# Patient Record
Sex: MALE | NOT HISPANIC OR LATINO | URBAN - METROPOLITAN AREA
[De-identification: names, ages, dates, MRNs, and addresses within clinical notes are randomized per-mention and may not be internally consistent; named-entity substitution may affect disease eponyms.]

---

## 2017-05-02 ENCOUNTER — OFFICE (OUTPATIENT)
Dept: URBAN - METROPOLITAN AREA CLINIC 11 | Facility: CLINIC | Age: 42
End: 2017-05-02

## 2017-05-02 VITALS
HEART RATE: 83 BPM | RESPIRATION RATE: 16 BRPM | SYSTOLIC BLOOD PRESSURE: 113 MMHG | WEIGHT: 173 LBS | HEIGHT: 71 IN | DIASTOLIC BLOOD PRESSURE: 77 MMHG

## 2017-05-02 DIAGNOSIS — R94.5 ABNORMAL RESULTS OF LIVER FUNCTION STUDIES: ICD-10-CM

## 2017-05-02 DIAGNOSIS — E80.4 GILBERT SYNDROME: ICD-10-CM

## 2017-05-02 DIAGNOSIS — J06.9 ACUTE UPPER RESPIRATORY INFECTION, UNSPECIFIED: ICD-10-CM

## 2017-05-02 DIAGNOSIS — K21.9 GASTRO-ESOPHAGEAL REFLUX DISEASE WITHOUT ESOPHAGITIS: ICD-10-CM

## 2017-05-02 LAB
CBC COMPLETE BLOOD COUNT W/O DIFF: HEMATOCRIT: 48.6 % (ref 39–55)
CBC COMPLETE BLOOD COUNT W/O DIFF: HEMOGLOBIN: 17.9 G/DL — HIGH (ref 13–17.5)
CBC COMPLETE BLOOD COUNT W/O DIFF: MCH: 35 PG (ref 25–35)
CBC COMPLETE BLOOD COUNT W/O DIFF: MCHC: 36.8 % (ref 30–38)
CBC COMPLETE BLOOD COUNT W/O DIFF: MCV: 94.9 FL (ref 78–102)
CBC COMPLETE BLOOD COUNT W/O DIFF: PLATELET COUNT: 194 K/UL (ref 150–450)
CBC COMPLETE BLOOD COUNT W/O DIFF: RBC DISTRIBUTION WIDTH: 13.3 % (ref 11.5–16)
CBC COMPLETE BLOOD COUNT W/O DIFF: RED BLOOD CELL COUNT: 5.12 M/UL (ref 4.3–5.7)
CBC COMPLETE BLOOD COUNT W/O DIFF: WHITE BLOOD CELL COUNT: 9.1 K/UL (ref 4–11)
GAMMA GLUTAMYL TRANSFERASE: GGT: 782 U/L — HIGH (ref 9–64)
HEPATIC FUNCTION PANEL A: ALBUMIN: 4.3 G/DL (ref 3.5–5.2)
HEPATIC FUNCTION PANEL A: ALKALINE PHOSPHATASE: 162 U/L — HIGH (ref 46–118)
HEPATIC FUNCTION PANEL A: DIRECT BILIRUBIN: 0.4 MG/DL — HIGH (ref 0–0.2)
HEPATIC FUNCTION PANEL A: SGOT (AST): <5 U/L — LOW
HEPATIC FUNCTION PANEL A: SGPT (ALT): 63 U/L — HIGH (ref 7–52)
HEPATIC FUNCTION PANEL A: TOTAL BILIRUBIN: 1.7 MG/DL — HIGH (ref 0.3–1.2)
HEPATIC FUNCTION PANEL A: TOTAL PROTEIN: 6.5 G/DL (ref 6.4–8.3)

## 2017-05-02 PROCEDURE — 99213 OFFICE O/P EST LOW 20 MIN: CPT | Performed by: INTERNAL MEDICINE

## 2017-05-02 RX ORDER — AZITHROMYCIN DIHYDRATE 250 MG/1
TABLET, FILM COATED ORAL
Qty: 6 | Refills: 0 | Status: COMPLETED
Start: 2017-05-02 | End: 2017-06-16

## 2017-05-02 NOTE — SERVICEHPINOTES
He has been having issues with a URI with a cough for about a week with a whitish to yellow mucus.  He has developed a sinus drainage over the past 2 days.  He has been using a Nyuil type product. He has not had fevers or chills.  He has had some lower chest pain with a feeling of sob with the cough.  He has not been seen for this yet.  As far as his reflux, he has been on the Prilosec and has been donig well.  He has been taking it about once every three days and has not ahd issues while on it.  He has not had issues with dysphagia.  He has not had any particular diet issues that cause problems.

## 2017-05-02 NOTE — SERVICENOTES
We discussed his normal LFTS and the f/u testing.  We also discussed his GERD, the use of the prilosec with intermittent dosing and the GERD diet.  As to the URI, I will start him on a Z-mat.  He was instructed to f/u with Dr. Quintanilla if his sx are not improving over the next couple of days.

## 2017-06-12 ENCOUNTER — INPATIENT HOSPITAL (OUTPATIENT)
Dept: URBAN - METROPOLITAN AREA HOSPITAL 130 | Facility: HOSPITAL | Age: 42
End: 2017-06-12

## 2017-06-12 DIAGNOSIS — K85.90 ACUTE PANCREATITIS WITHOUT NECROSIS OR INFECTION, UNSPECIFIE: ICD-10-CM

## 2017-06-12 DIAGNOSIS — E78.1 PURE HYPERGLYCERIDEMIA: ICD-10-CM

## 2017-06-12 PROCEDURE — 99254 IP/OBS CNSLTJ NEW/EST MOD 60: CPT | Performed by: INTERNAL MEDICINE

## 2017-06-13 PROCEDURE — 99231 SBSQ HOSP IP/OBS SF/LOW 25: CPT | Performed by: INTERNAL MEDICINE

## 2017-06-14 ENCOUNTER — INPATIENT HOSPITAL (OUTPATIENT)
Dept: URBAN - METROPOLITAN AREA HOSPITAL 130 | Facility: HOSPITAL | Age: 42
End: 2017-06-14
Payer: COMMERCIAL

## 2017-06-14 DIAGNOSIS — K85.90 ACUTE PANCREATITIS WITHOUT NECROSIS OR INFECTION, UNSPECIFIE: ICD-10-CM

## 2017-06-14 DIAGNOSIS — E78.1 PURE HYPERGLYCERIDEMIA: ICD-10-CM

## 2017-06-14 PROCEDURE — 99231 SBSQ HOSP IP/OBS SF/LOW 25: CPT | Performed by: INTERNAL MEDICINE

## 2017-06-16 ENCOUNTER — OFFICE (OUTPATIENT)
Dept: URBAN - METROPOLITAN AREA CLINIC 11 | Facility: CLINIC | Age: 42
End: 2017-06-16
Payer: COMMERCIAL

## 2017-06-16 VITALS
SYSTOLIC BLOOD PRESSURE: 113 MMHG | WEIGHT: 170 LBS | HEIGHT: 71 IN | RESPIRATION RATE: 16 BRPM | HEART RATE: 77 BPM | DIASTOLIC BLOOD PRESSURE: 81 MMHG

## 2017-06-16 DIAGNOSIS — R94.5 ABNORMAL RESULTS OF LIVER FUNCTION STUDIES: ICD-10-CM

## 2017-06-16 DIAGNOSIS — K85.90 ACUTE PANCREATITIS WITHOUT NECROSIS OR INFECTION, UNSPECIFIE: ICD-10-CM

## 2017-06-16 DIAGNOSIS — E11.9 TYPE 2 DIABETES MELLITUS WITHOUT COMPLICATIONS: ICD-10-CM

## 2017-06-16 PROCEDURE — 99214 OFFICE O/P EST MOD 30 MIN: CPT | Performed by: INTERNAL MEDICINE

## 2017-06-16 PROCEDURE — 36415 COLL VENOUS BLD VENIPUNCTURE: CPT | Performed by: INTERNAL MEDICINE

## 2017-06-16 NOTE — SERVICEHPINOTES
He was admitted last week to [facility] for acute abdominal pain and was dx with pancreatitis. His Ct revealed inflammatory changes of the pancreatic tail. He was noted to have triglycerides of 1700.   He was treated with diet restrictions.  After improving he was d/c for outpt f/u.  He did have fish oil added to his meds.  He has been having a pain in the right flank since then and has been using oxycodone for the pain about twice a day.  The pain is worse with walking and activities.  He has not had fevers or chills.  He has not had n/v issues.  Eating has some effect on the pain but it is mostly worse with activity.  His consult notes were reveiwed. BR

## 2017-06-16 NOTE — SERVICENOTES
We discussed the new issue with the pancreatitis and the elevated triglycerides.  We discussed the pancreatitis related to this issue can be quite severe and lethal if uncontrolled.  I would like him to see the lipid clinic at [facility] and he will need to f/u with Dr. Quintanilla about his DM control and possibly with endocrine.  He will need a f/u Ct of the pancreas to ensure resolution and if there are still issues possibly an MRCP (r/o divisum, occult stones, etc...).  Of note he has not had stones on CT or u/s.   We discussed his need to avoid all ETOH and again discussed the elevated GGT (?occult ETOH use).  We reivewed his meds and he is on a metformin combination medication which is not listed to cause pancreatitis and his valsartan is not in combination with HCTZ.

## 2017-06-17 LAB
AMYLASE: AMYLASE,SERUM: 33 U/L (ref 28–120)
CBCI COMPLETE BLOOD COUNT W/ DIFF: ABS BASOPHILS: 0 K/UL (ref 0–0.3)
CBCI COMPLETE BLOOD COUNT W/ DIFF: ABS EOSINOPHILS: 0.4 K/UL (ref 0.05–0.5)
CBCI COMPLETE BLOOD COUNT W/ DIFF: ABS LYMPHOCYTES: 1.8 K/UL (ref 1–4)
CBCI COMPLETE BLOOD COUNT W/ DIFF: ABS MONOCYTES: 0.4 K/UL (ref 0.1–1.1)
CBCI COMPLETE BLOOD COUNT W/ DIFF: ABS NEUTROPHILS: 3.6 K/UL (ref 1.8–7)
CBCI COMPLETE BLOOD COUNT W/ DIFF: BASOPHILS: 0 % (ref 0–3)
CBCI COMPLETE BLOOD COUNT W/ DIFF: EOSINOPHILS: 6 % (ref 1–7)
CBCI COMPLETE BLOOD COUNT W/ DIFF: HEMATOCRIT: 45.5 % (ref 39–55)
CBCI COMPLETE BLOOD COUNT W/ DIFF: HEMOGLOBIN: 15.4 G/DL (ref 13–17.5)
CBCI COMPLETE BLOOD COUNT W/ DIFF: LYMPHOCYTES: 27 % (ref 20–48)
CBCI COMPLETE BLOOD COUNT W/ DIFF: MCH: 32.4 PG (ref 25–35)
CBCI COMPLETE BLOOD COUNT W/ DIFF: MCHC: 33.8 % (ref 30–38)
CBCI COMPLETE BLOOD COUNT W/ DIFF: MCV: 95.8 FL (ref 78–102)
CBCI COMPLETE BLOOD COUNT W/ DIFF: MONOCYTES: 6 % (ref 3–14)
CBCI COMPLETE BLOOD COUNT W/ DIFF: NEUTROPHILS: 56 % (ref 40–70)
CBCI COMPLETE BLOOD COUNT W/ DIFF: PLATELET COUNT: 214 K/UL (ref 150–450)
CBCI COMPLETE BLOOD COUNT W/ DIFF: PLT MORPHOLOGY: (no result)
CBCI COMPLETE BLOOD COUNT W/ DIFF: RBC DISTRIBUTION WIDTH: 13.2 % (ref 11.5–16)
CBCI COMPLETE BLOOD COUNT W/ DIFF: RBC MORPH: (no result)
CBCI COMPLETE BLOOD COUNT W/ DIFF: RED BLOOD CELL COUNT: 4.75 M/UL (ref 4.3–5.7)
CBCI COMPLETE BLOOD COUNT W/ DIFF: WHITE BLOOD CELL COUNT: 6.5 K/UL (ref 4–11)
COMPREHENSIVE METABOLIC PANEL: ALBUMIN: 4.4 G/DL (ref 3.5–5.2)
COMPREHENSIVE METABOLIC PANEL: ALKALINE PHOSPHATASE: 272 U/L — HIGH (ref 46–118)
COMPREHENSIVE METABOLIC PANEL: CALCIUM TOTAL: 10.4 MG/DL (ref 8.5–10.5)
COMPREHENSIVE METABOLIC PANEL: CARBON DIOXIDE: 25 MEQ/L (ref 21–31)
COMPREHENSIVE METABOLIC PANEL: CHLORIDE: 94 MEQ/L — LOW (ref 96–106)
COMPREHENSIVE METABOLIC PANEL: CREATININE: 0.78 MG/DL — LOW (ref 0.8–1.4)
COMPREHENSIVE METABOLIC PANEL: FASTING/NON-FASTING: (no result)
COMPREHENSIVE METABOLIC PANEL: GLUCOSE: 266 MG/DL — HIGH (ref 65–100)
COMPREHENSIVE METABOLIC PANEL: POTASSIUM: 4.1 MEQ/ML (ref 3.5–5.4)
COMPREHENSIVE METABOLIC PANEL: SGOT (AST): 27 U/L (ref 13–40)
COMPREHENSIVE METABOLIC PANEL: SGPT (ALT): 71 U/L — HIGH (ref 7–52)
COMPREHENSIVE METABOLIC PANEL: SODIUM: 138 MEQ/L (ref 135–145)
COMPREHENSIVE METABOLIC PANEL: TOTAL BILIRUBIN: 1.2 MG/DL (ref 0.3–1.2)
COMPREHENSIVE METABOLIC PANEL: TOTAL PROTEIN: 6.9 G/DL (ref 6.4–8.3)
COMPREHENSIVE METABOLIC PANEL: UREA NITROGEN: 12 MG/DL (ref 6–20)
GAMMA GLUTAMYL TRANSFERASE: GGT: 908 U/L — HIGH (ref 9–64)
LIPASE: 41 U/L (ref 11–82)
Lab: 0 %
Lab: 0 /100 WBCS
Lab: 1 % (ref 0–10)
Lab: 1 % — HIGH
Lab: 3 % — HIGH
Lab: NEGATIVE
SED RATE - ERYTHROCYTE SED RATE: SED RATE: 22 MM/HR — HIGH

## 2017-06-22 ENCOUNTER — OFFICE (OUTPATIENT)
Dept: URBAN - METROPOLITAN AREA CLINIC 22 | Facility: CLINIC | Age: 42
End: 2017-06-22
Payer: COMMERCIAL

## 2017-06-22 DIAGNOSIS — R94.5 ABNORMAL RESULTS OF LIVER FUNCTION STUDIES: ICD-10-CM

## 2017-06-22 PROCEDURE — 74177 CT ABD & PELVIS W/CONTRAST: CPT | Performed by: INTERNAL MEDICINE

## 2017-07-21 ENCOUNTER — OFFICE (OUTPATIENT)
Dept: URBAN - METROPOLITAN AREA CLINIC 11 | Facility: CLINIC | Age: 42
End: 2017-07-21
Payer: COMMERCIAL

## 2017-07-21 VITALS
WEIGHT: 176 LBS | HEIGHT: 71 IN | DIASTOLIC BLOOD PRESSURE: 56 MMHG | SYSTOLIC BLOOD PRESSURE: 104 MMHG | HEART RATE: 82 BPM

## 2017-07-21 DIAGNOSIS — K85.90 ACUTE PANCREATITIS WITHOUT NECROSIS OR INFECTION, UNSPECIFIE: ICD-10-CM

## 2017-07-21 DIAGNOSIS — K86.2 CYST OF PANCREAS: ICD-10-CM

## 2017-07-21 LAB
HEPATIC FUNCTION PANEL A: ALBUMIN: 4.6 G/DL (ref 3.5–5.2)
HEPATIC FUNCTION PANEL A: ALKALINE PHOSPHATASE: 88 U/L (ref 46–118)
HEPATIC FUNCTION PANEL A: DIRECT BILIRUBIN: 0.3 MG/DL — HIGH (ref 0–0.2)
HEPATIC FUNCTION PANEL A: SGOT (AST): 23 U/L (ref 13–40)
HEPATIC FUNCTION PANEL A: SGPT (ALT): 38 U/L (ref 7–52)
HEPATIC FUNCTION PANEL A: TOTAL BILIRUBIN: 1.1 MG/DL (ref 0.3–1.2)
HEPATIC FUNCTION PANEL A: TOTAL PROTEIN: 6.8 G/DL (ref 6.4–8.3)

## 2017-07-21 PROCEDURE — 99213 OFFICE O/P EST LOW 20 MIN: CPT | Performed by: INTERNAL MEDICINE

## 2017-07-21 PROCEDURE — 36415 COLL VENOUS BLD VENIPUNCTURE: CPT | Performed by: INTERNAL MEDICINE

## 2017-07-21 NOTE — SERVICENOTES
He has been doing better and his pancreatitis appears to have resolved.  We discussed the need to treat his diabetes and triglycerides and the longterm risks of complications with them.  We discussed the cyst noted on his CT and that this was likely a pseudocyst and should resolve over time.  He will need a f/u study and I will order the MRCP instead of a CT.  We discussed the GGT elevation.  He has been etoh free.

## 2017-07-21 NOTE — SERVICEHPINOTES
He presents for f/u of his pancreatitis which appeared to be lipid related.  He stated that his back pain has resolved as has his abdominal pain. He has not had pain with eating and stated that he has been dieting and making changes to his diet with his lipids in mind.  He has not had n/v issues or f/c.  His Ct revealed a possible developing cyst but he did have some imrpovement from his hospital scans.  He has been seen at [facility] in the lipid clinic.  He has been on fish oil and they have done diet teachings.  He has not had his meds changed.He has thought that his diabetes was better but has not been checking his glucoses.  He just refilled his strips.

## 2017-10-30 ENCOUNTER — OFFICE (OUTPATIENT)
Dept: URBAN - METROPOLITAN AREA CLINIC 11 | Facility: CLINIC | Age: 42
End: 2017-10-30
Payer: COMMERCIAL

## 2017-10-30 VITALS
SYSTOLIC BLOOD PRESSURE: 117 MMHG | HEIGHT: 71 IN | WEIGHT: 178 LBS | HEART RATE: 82 BPM | DIASTOLIC BLOOD PRESSURE: 73 MMHG

## 2017-10-30 DIAGNOSIS — K21.9 GASTRO-ESOPHAGEAL REFLUX DISEASE WITHOUT ESOPHAGITIS: ICD-10-CM

## 2017-10-30 DIAGNOSIS — R94.5 ABNORMAL RESULTS OF LIVER FUNCTION STUDIES: ICD-10-CM

## 2017-10-30 DIAGNOSIS — K86.3 PSEUDOCYST OF PANCREAS: ICD-10-CM

## 2017-10-30 LAB
HEPATIC FUNCTION PANEL (7): ALBUMIN, SERUM: 4.7 G/DL (ref 3.5–5.5)
HEPATIC FUNCTION PANEL (7): ALKALINE PHOSPHATASE, S: 122 IU/L — HIGH (ref 39–117)
HEPATIC FUNCTION PANEL (7): ALT (SGPT): 45 IU/L — HIGH (ref 0–44)
HEPATIC FUNCTION PANEL (7): AST (SGOT): 17 IU/L (ref 0–40)
HEPATIC FUNCTION PANEL (7): BILIRUBIN, DIRECT: 0.2 MG/DL (ref 0–0.4)
HEPATIC FUNCTION PANEL (7): BILIRUBIN, TOTAL: 0.9 MG/DL (ref 0–1.2)
HEPATIC FUNCTION PANEL (7): PROTEIN, TOTAL, SERUM: 7.1 G/DL (ref 6–8.5)

## 2017-10-30 PROCEDURE — 99213 OFFICE O/P EST LOW 20 MIN: CPT | Performed by: INTERNAL MEDICINE

## 2017-10-30 NOTE — SERVICEHPINOTES
He presents for f/u of his abnormal GGT, prior pancreatic cyst, and studies.  He has not had issues with recurrent abdominal pain.  He has not had n/v, f/c, unexpected weightloss, or other new GI issues.  He stated that he is being followed by Dr. Quintanilla and that his glucose and triglyceride control were much better.  He has had his MRI with a normal liver and pancreas (prior pancreatic tail cyst had resolved).  He had a normal biliary eval on MRCP as well.  He stated that his reflux has been under good control as long as he takes his meds.  He has not had issues with dysphagia.

## 2017-10-30 NOTE — SERVICENOTES
We discussd the resolution of the pseudocyst and need for control of his triglycerides to help prevent a recurrent bout of pancreatitis.  We discussed his elevated GGT, negative studies, and the evaluation by fibroscan (rather than bx) for GALLARDO changes.  He will need his f/u labs.

## 2017-11-29 ENCOUNTER — OFFICE (OUTPATIENT)
Dept: URBAN - METROPOLITAN AREA CLINIC 14 | Facility: CLINIC | Age: 42
End: 2017-11-29
Payer: COMMERCIAL

## 2017-11-29 DIAGNOSIS — R94.5 ABNORMAL RESULTS OF LIVER FUNCTION STUDIES: ICD-10-CM

## 2017-11-29 PROCEDURE — 91200 LIVER ELASTOGRAPHY: CPT | Performed by: INTERNAL MEDICINE

## 2017-12-18 ENCOUNTER — OFFICE (OUTPATIENT)
Dept: URBAN - METROPOLITAN AREA CLINIC 11 | Facility: CLINIC | Age: 42
End: 2017-12-18
Payer: COMMERCIAL

## 2017-12-18 VITALS
WEIGHT: 177 LBS | HEART RATE: 90 BPM | DIASTOLIC BLOOD PRESSURE: 67 MMHG | SYSTOLIC BLOOD PRESSURE: 107 MMHG | HEIGHT: 71 IN

## 2017-12-18 DIAGNOSIS — R94.5 ABNORMAL RESULTS OF LIVER FUNCTION STUDIES: ICD-10-CM

## 2017-12-18 LAB
CBC, PLATELET, NO DIFFERENTIAL: HEMATOCRIT: 49.7 % (ref 37.5–51)
CBC, PLATELET, NO DIFFERENTIAL: HEMOGLOBIN: 17 G/DL (ref 13–17.7)
CBC, PLATELET, NO DIFFERENTIAL: MCH: 32.9 PG (ref 26.6–33)
CBC, PLATELET, NO DIFFERENTIAL: MCHC: 34.2 G/DL (ref 31.5–35.7)
CBC, PLATELET, NO DIFFERENTIAL: MCV: 96 FL (ref 79–97)
CBC, PLATELET, NO DIFFERENTIAL: PLATELETS: 190 X10E3/UL (ref 150–379)
CBC, PLATELET, NO DIFFERENTIAL: RBC: 5.17 X10E6/UL (ref 4.14–5.8)
CBC, PLATELET, NO DIFFERENTIAL: RDW: 13.5 % (ref 12.3–15.4)
CBC, PLATELET, NO DIFFERENTIAL: WBC: 8.8 X10E3/UL (ref 3.4–10.8)
COMP. METABOLIC PANEL (14): A/G RATIO: 1.9 (ref 1.2–2.2)
COMP. METABOLIC PANEL (14): ALBUMIN, SERUM: 4.3 G/DL (ref 3.5–5.5)
COMP. METABOLIC PANEL (14): ALKALINE PHOSPHATASE, S: 125 IU/L — HIGH (ref 39–117)
COMP. METABOLIC PANEL (14): ALT (SGPT): 69 IU/L — HIGH (ref 0–44)
COMP. METABOLIC PANEL (14): AST (SGOT): 47 IU/L — HIGH (ref 0–40)
COMP. METABOLIC PANEL (14): BILIRUBIN, TOTAL: 2.5 MG/DL — HIGH (ref 0–1.2)
COMP. METABOLIC PANEL (14): BUN/CREATININE RATIO: 17 (ref 9–20)
COMP. METABOLIC PANEL (14): BUN: 16 MG/DL (ref 6–24)
COMP. METABOLIC PANEL (14): CALCIUM, SERUM: 9.3 MG/DL (ref 8.7–10.2)
COMP. METABOLIC PANEL (14): CARBON DIOXIDE, TOTAL: 25 MMOL/L (ref 18–29)
COMP. METABOLIC PANEL (14): CHLORIDE, SERUM: 96 MMOL/L (ref 96–106)
COMP. METABOLIC PANEL (14): CREATININE, SERUM: 0.93 MG/DL (ref 0.76–1.27)
COMP. METABOLIC PANEL (14): EGFR IF AFRICN AM: 117 ML/MIN/1.73 (ref 59–?)
COMP. METABOLIC PANEL (14): EGFR IF NONAFRICN AM: 102 ML/MIN/1.73 (ref 59–?)
COMP. METABOLIC PANEL (14): GLOBULIN, TOTAL: 2.3 G/DL (ref 1.5–4.5)
COMP. METABOLIC PANEL (14): GLUCOSE, SERUM: 228 MG/DL — HIGH (ref 65–99)
COMP. METABOLIC PANEL (14): POTASSIUM, SERUM: 4 MMOL/L (ref 3.5–5.2)
COMP. METABOLIC PANEL (14): PROTEIN, TOTAL, SERUM: 6.6 G/DL (ref 6–8.5)
COMP. METABOLIC PANEL (14): SODIUM, SERUM: 140 MMOL/L (ref 134–144)
GGT: 675 IU/L — CRITICAL HIGH (ref 0–65)
PROTHROMBIN TIME (PT): INR: 1 (ref 0.8–1.2)
PROTHROMBIN TIME (PT): PROTHROMBIN TIME: 10.4 SEC (ref 9.1–12)

## 2017-12-18 PROCEDURE — 99213 OFFICE O/P EST LOW 20 MIN: CPT | Performed by: INTERNAL MEDICINE

## 2017-12-18 NOTE — SERVICEHPINOTES
He presents to f/u after his abnormal Fibroscan.  He has not had any new GI issues other than what appeared to be a viral issue this last weekend with about 24 hours of n/v and a possible fever.  His scoring was suggestive of fibrosis which is not consistent with his labs or other studies.  He has not had ETOH or other new meds.

## 2018-01-17 ENCOUNTER — OFFICE (OUTPATIENT)
Dept: URBAN - METROPOLITAN AREA PATHOLOGY 22 | Facility: PATHOLOGY | Age: 43
End: 2018-01-17
Payer: COMMERCIAL

## 2018-01-17 DIAGNOSIS — R94.5 ABNORMAL RESULTS OF LIVER FUNCTION STUDIES: ICD-10-CM

## 2018-01-17 PROCEDURE — 88325 CONSLTJ COMPRE RVW REC REPRT: CPT

## 2018-05-08 ENCOUNTER — OFFICE (OUTPATIENT)
Dept: URBAN - METROPOLITAN AREA CLINIC 11 | Facility: CLINIC | Age: 43
End: 2018-05-08
Payer: COMMERCIAL

## 2018-05-08 VITALS
DIASTOLIC BLOOD PRESSURE: 78 MMHG | HEART RATE: 65 BPM | HEIGHT: 71 IN | SYSTOLIC BLOOD PRESSURE: 122 MMHG | WEIGHT: 178 LBS

## 2018-05-08 DIAGNOSIS — K75.81 NONALCOHOLIC STEATOHEPATITIS (NASH): ICD-10-CM

## 2018-05-08 DIAGNOSIS — E78.5 HYPERLIPIDEMIA, UNSPECIFIED: ICD-10-CM

## 2018-05-08 DIAGNOSIS — E11.9 TYPE 2 DIABETES MELLITUS WITHOUT COMPLICATIONS: ICD-10-CM

## 2018-05-08 DIAGNOSIS — K21.9 GASTRO-ESOPHAGEAL REFLUX DISEASE WITHOUT ESOPHAGITIS: ICD-10-CM

## 2018-05-08 DIAGNOSIS — E78.1 PURE HYPERGLYCERIDEMIA: ICD-10-CM

## 2018-05-08 PROCEDURE — 99213 OFFICE O/P EST LOW 20 MIN: CPT | Performed by: INTERNAL MEDICINE

## 2018-05-08 NOTE — SERVICENOTES
We discussed his GALLARDO, reviewed his bx/labs, discussed the need for tighter diabetes control (and f/u with Dr. Quintanilla), the use of his fish oil and referral to [facility]id clinic (triglyceride/HDL issues with prior pancreatitis) for lipid control and heart scoring (diabetes and lipid issues).  His GERD has been stable on meds.  We reviewed his labs from research.

## 2018-05-08 NOTE — SERVICEHPINOTES
He presents for f/u of his GALLARDO.  He has had early fibrosis and was not a candidate for the current studies.  He was noted to have elevated triglyceride of 1000 and was started on Pravastatin.  His fasting glucose was 305 on one of the drug studies.  He has had diabetes since 2011/12.  He has not been checking his sugars at home. He has not had issues with his GERD on his meds.He has not had recurrent pancreatitis (triglyceride issues).

## 2020-09-25 NOTE — SERVICENOTES
We dsicused the findings of the fibroscan suggesting fibrosis and possible occult GALLARDO/NAFLD.  We discussed his otherwise normal LFTS and that this may be a subclinical issue.  We discussed the need for a clearer diagnosis and the r/b/a/expectations to liver bx.  He agreed to proceed.  We discussed potential drug studies/interventions if GALLARDO is present.
2

## 2021-11-19 ENCOUNTER — OFFICE (OUTPATIENT)
Dept: URBAN - METROPOLITAN AREA CLINIC 11 | Facility: CLINIC | Age: 46
End: 2021-11-19

## 2021-11-19 VITALS
OXYGEN SATURATION: 98 % | WEIGHT: 166 LBS | HEART RATE: 72 BPM | DIASTOLIC BLOOD PRESSURE: 80 MMHG | HEIGHT: 70 IN | SYSTOLIC BLOOD PRESSURE: 120 MMHG

## 2021-11-19 DIAGNOSIS — R10.11 RIGHT UPPER QUADRANT PAIN: ICD-10-CM

## 2021-11-19 DIAGNOSIS — K75.81 NONALCOHOLIC STEATOHEPATITIS (NASH): ICD-10-CM

## 2021-11-19 DIAGNOSIS — K80.20 CALCULUS OF GALLBLADDER WITHOUT CHOLECYSTITIS WITHOUT OBSTRU: ICD-10-CM

## 2021-11-19 LAB
NASH FIBROSURE: ALPHA 2-MACROGLOBULINS, QN: 139 MG/DL (ref 110–276)
NASH FIBROSURE: ALT (SGPT) P5P: 43 IU/L (ref 0–55)
NASH FIBROSURE: APOLIPOPROTEIN A-1: 142 MG/DL (ref 101–178)
NASH FIBROSURE: AST (SGOT) P5P: 20 IU/L (ref 0–40)
NASH FIBROSURE: BILIRUBIN, TOTAL: 1 MG/DL (ref 0–1.2)
NASH FIBROSURE: CHOLESTEROL, TOTAL: 306 MG/DL — HIGH (ref 100–199)
NASH FIBROSURE: COMMENT: (no result)
NASH FIBROSURE: FIBROSIS SCORE: (no result)
NASH FIBROSURE: FIBROSIS SCORING: (no result)
NASH FIBROSURE: FIBROSIS STAGE: (no result)
NASH FIBROSURE: GGT: 376 IU/L — HIGH (ref 0–65)
NASH FIBROSURE: GLUCOSE, SERUM: 302 MG/DL — HIGH (ref 65–99)
NASH FIBROSURE: HAPTOGLOBIN: 76 MG/DL (ref 23–355)
NASH FIBROSURE: HEIGHT: 70 IN
NASH FIBROSURE: INTERPRETATIONS: (no result)
NASH FIBROSURE: LIMITATIONS: (no result)
NASH FIBROSURE: NASH GRADE: (no result)
NASH FIBROSURE: NASH SCORE: (no result)
NASH FIBROSURE: NASH SCORING: (no result)
NASH FIBROSURE: STEATOSIS GRADE: (no result)
NASH FIBROSURE: STEATOSIS GRADING: (no result)
NASH FIBROSURE: STEATOSIS SCORE: (no result)
NASH FIBROSURE: TRIGLYCERIDES: 1413 MG/DL — CRITICAL HIGH (ref 0–149)
NASH FIBROSURE: WEIGHT: 166 LBS

## 2021-11-19 PROCEDURE — 99204 OFFICE O/P NEW MOD 45 MIN: CPT | Performed by: NURSE PRACTITIONER

## 2021-11-19 NOTE — SERVICEHPINOTES
Mr. Campa is a 46 year old male that presents today for RUQ pain. The pain started two Fridays ago. He notes that the pain radiated to his back and lasted through the following Wednesday. He notes that the back pain was constant with increase and decrease in intensity throughout each day. Pain is characterized as sharp. He is not sure what he had eaten before the pain started. He took Aleve with some improvement of his pain. He notes that he has had some nausea off and on that is very seldom, but he denies vomiting. 
br
nneka He has a history of GALLARDO that was confirmed in 2017 via liver biopsy

## 2021-11-19 NOTE — SERVICENOTES
With recent gallstones and mild RUQ tenderness will plan for referral to Dr. Winkler for evaluation for david. Will request for liver biopsy if surgery is performed since he has a history of GALLARDO. Will get fibrosure and plan for fibroscan to assess for any fibrosis.

## 2021-11-30 ENCOUNTER — OFFICE (OUTPATIENT)
Dept: URBAN - METROPOLITAN AREA CLINIC 14 | Facility: CLINIC | Age: 46
End: 2021-11-30

## 2021-11-30 VITALS — HEIGHT: 70 IN

## 2021-11-30 DIAGNOSIS — K76.0 FATTY (CHANGE OF) LIVER, NOT ELSEWHERE CLASSIFIED: ICD-10-CM

## 2021-11-30 PROCEDURE — 91200 LIVER ELASTOGRAPHY: CPT | Performed by: NURSE PRACTITIONER
